# Patient Record
Sex: FEMALE | Race: WHITE | NOT HISPANIC OR LATINO | Employment: FULL TIME | URBAN - METROPOLITAN AREA
[De-identification: names, ages, dates, MRNs, and addresses within clinical notes are randomized per-mention and may not be internally consistent; named-entity substitution may affect disease eponyms.]

---

## 2024-03-04 ENCOUNTER — OFFICE VISIT (OUTPATIENT)
Dept: URGENT CARE | Facility: CLINIC | Age: 29
End: 2024-03-04
Payer: COMMERCIAL

## 2024-03-04 VITALS
DIASTOLIC BLOOD PRESSURE: 74 MMHG | BODY MASS INDEX: 26.12 KG/M2 | HEIGHT: 64 IN | RESPIRATION RATE: 16 BRPM | HEART RATE: 80 BPM | OXYGEN SATURATION: 100 % | TEMPERATURE: 99 F | WEIGHT: 153 LBS | SYSTOLIC BLOOD PRESSURE: 129 MMHG

## 2024-03-04 DIAGNOSIS — J01.40 ACUTE NON-RECURRENT PANSINUSITIS: Primary | ICD-10-CM

## 2024-03-04 DIAGNOSIS — J02.9 SORE THROAT: ICD-10-CM

## 2024-03-04 LAB
CTP QC/QA: YES
MOLECULAR STREP A: NEGATIVE

## 2024-03-04 PROCEDURE — 96372 THER/PROPH/DIAG INJ SC/IM: CPT | Mod: ,,, | Performed by: FAMILY MEDICINE

## 2024-03-04 PROCEDURE — 87651 STREP A DNA AMP PROBE: CPT | Mod: QW,,, | Performed by: FAMILY MEDICINE

## 2024-03-04 PROCEDURE — 99214 OFFICE O/P EST MOD 30 MIN: CPT | Mod: 25,,, | Performed by: FAMILY MEDICINE

## 2024-03-04 RX ORDER — AMOXICILLIN AND CLAVULANATE POTASSIUM 875; 125 MG/1; MG/1
1 TABLET, FILM COATED ORAL EVERY 12 HOURS
Qty: 14 TABLET | Refills: 0 | Status: SHIPPED | OUTPATIENT
Start: 2024-03-04 | End: 2024-03-11

## 2024-03-04 RX ORDER — ALBUTEROL SULFATE 90 UG/1
2 AEROSOL, METERED RESPIRATORY (INHALATION) EVERY 4 HOURS PRN
COMMUNITY
Start: 2023-12-03

## 2024-03-04 RX ORDER — NORGESTREL AND ETHINYL ESTRADIOL 0.3-0.03MG
1 KIT ORAL
COMMUNITY
Start: 2024-02-26

## 2024-03-04 RX ORDER — BETAMETHASONE SODIUM PHOSPHATE AND BETAMETHASONE ACETATE 3; 3 MG/ML; MG/ML
6 INJECTION, SUSPENSION INTRA-ARTICULAR; INTRALESIONAL; INTRAMUSCULAR; SOFT TISSUE
Status: COMPLETED | OUTPATIENT
Start: 2024-03-04 | End: 2024-03-04

## 2024-03-04 RX ADMIN — BETAMETHASONE SODIUM PHOSPHATE AND BETAMETHASONE ACETATE 6 MG: 3; 3 INJECTION, SUSPENSION INTRA-ARTICULAR; INTRALESIONAL; INTRAMUSCULAR; SOFT TISSUE at 06:03

## 2024-03-05 NOTE — PROGRESS NOTES
"Subjective:      Patient ID: Shannon Phipps is a 28 y.o. female.    Vitals:  height is 5' 4" (1.626 m) and weight is 69.4 kg (153 lb). Her temperature is 98.9 °F (37.2 °C). Her blood pressure is 129/74 and her pulse is 80. Her respiration is 16 and oxygen saturation is 100%.     Chief Complaint: Sore Throat (Sore throat, congestion, green mucus x last Tuesday. Taking Mucinex. )    6 days of sinusitis, cough, congestion.  No fever. No CP or SOB.          Constitution: Negative for chills, fatigue and fever.   HENT:  Positive for congestion, postnasal drip, sinus pressure and sore throat. Negative for trouble swallowing.    Neck: Negative for neck pain and neck stiffness.   Cardiovascular:  Negative for chest pain, leg swelling and sob on exertion.   Respiratory:  Positive for cough. Negative for chest tightness, shortness of breath and wheezing.    Neurological:  Negative for dizziness, disorientation and altered mental status.   Psychiatric/Behavioral:  Negative for altered mental status and disorientation.       Objective:     Physical Exam   Constitutional: She is oriented to person, place, and time. She appears well-developed.  Non-toxic appearance. No distress.   HENT:   Head: Normocephalic.   Ears:   Right Ear: Tympanic membrane and external ear normal.   Left Ear: Tympanic membrane and external ear normal.   Nose: Rhinorrhea present.   Mouth/Throat: Uvula is midline and mucous membranes are normal. No uvula swelling. Posterior oropharyngeal erythema and cobblestoning present. No oropharyngeal exudate or posterior oropharyngeal edema. Tonsils are 0 on the right. Tonsils are 0 on the left. No tonsillar exudate.   Eyes: Pupils are equal, round, and reactive to light. Right eye exhibits no discharge. Left eye exhibits no discharge.   Neck: Neck supple. No tracheal deviation present.   Cardiovascular: Normal rate, regular rhythm and normal heart sounds.   No murmur heard.  Pulmonary/Chest: Effort normal and breath " sounds normal. No stridor. No respiratory distress. She has no wheezes.   Lymphadenopathy:     She has no cervical adenopathy.   Neurological: no focal deficit. She is alert and oriented to person, place, and time.   Skin: Skin is warm and dry.   Psychiatric: Thought content normal.   Nursing note and vitals reviewed.      Assessment:     1. Acute non-recurrent pansinusitis    2. Sore throat        Plan:       Acute non-recurrent pansinusitis  -     betamethasone acetate-betamethasone sodium phosphate injection 6 mg  -     amoxicillin-clavulanate 875-125mg (AUGMENTIN) 875-125 mg per tablet; Take 1 tablet by mouth every 12 (twelve) hours. for 7 days  Dispense: 14 tablet; Refill: 0    Sore throat  -     POCT Strep A, Molecular              Strep test negative.

## 2024-03-14 ENCOUNTER — OFFICE VISIT (OUTPATIENT)
Dept: URGENT CARE | Facility: CLINIC | Age: 29
End: 2024-03-14
Payer: COMMERCIAL

## 2024-03-14 VITALS
HEART RATE: 91 BPM | TEMPERATURE: 98 F | WEIGHT: 150 LBS | BODY MASS INDEX: 25.61 KG/M2 | HEIGHT: 64 IN | RESPIRATION RATE: 18 BRPM | DIASTOLIC BLOOD PRESSURE: 84 MMHG | OXYGEN SATURATION: 100 % | SYSTOLIC BLOOD PRESSURE: 131 MMHG

## 2024-03-14 DIAGNOSIS — U07.1 COVID: Primary | ICD-10-CM

## 2024-03-14 DIAGNOSIS — J02.9 SORE THROAT: ICD-10-CM

## 2024-03-14 LAB
CTP QC/QA: YES
MOLECULAR STREP A: NEGATIVE
POC MOLECULAR INFLUENZA A AGN: NEGATIVE
POC MOLECULAR INFLUENZA B AGN: NEGATIVE
SARS-COV-2 RDRP RESP QL NAA+PROBE: POSITIVE

## 2024-03-14 PROCEDURE — 87502 INFLUENZA DNA AMP PROBE: CPT | Mod: QW,,, | Performed by: FAMILY MEDICINE

## 2024-03-14 PROCEDURE — 87651 STREP A DNA AMP PROBE: CPT | Mod: QW,,, | Performed by: FAMILY MEDICINE

## 2024-03-14 PROCEDURE — 99214 OFFICE O/P EST MOD 30 MIN: CPT | Mod: ,,, | Performed by: FAMILY MEDICINE

## 2024-03-14 PROCEDURE — 87635 SARS-COV-2 COVID-19 AMP PRB: CPT | Mod: QW,,, | Performed by: FAMILY MEDICINE

## 2024-03-14 RX ORDER — PREDNISONE 20 MG/1
20 TABLET ORAL 2 TIMES DAILY
Qty: 6 TABLET | Refills: 0 | Status: SHIPPED | OUTPATIENT
Start: 2024-03-14 | End: 2024-03-17

## 2024-03-14 NOTE — PROGRESS NOTES
"Subjective:      Patient ID: Shannon Phipps is a 28 y.o. female.    Vitals:  height is 5' 4" (1.626 m) and weight is 68 kg (150 lb). Her temperature is 98.4 °F (36.9 °C). Her blood pressure is 131/84 and her pulse is 91. Her respiration is 18 and oxygen saturation is 100%.     Chief Complaint: Sinus Problem (Congestion, cough, sore throat, chills, sweats x Tuesday. Here on 3/4 for similar symptoms, given steroid injection and Augmentin -- did get better. )    2 days of cough, congestion, pharyngitis. No fever. Similar symptoms a couple of weeks ago that resolved.          Constitution: Negative for chills, fatigue and fever.   HENT:  Positive for congestion, postnasal drip and sore throat. Negative for sinus pressure and trouble swallowing.    Neck: Negative for neck pain and neck stiffness.   Cardiovascular:  Negative for chest pain, leg swelling and sob on exertion.   Respiratory:  Positive for cough. Negative for chest tightness, shortness of breath and wheezing.    Neurological:  Negative for dizziness, disorientation and altered mental status.   Psychiatric/Behavioral:  Negative for altered mental status and disorientation.       Objective:     Physical Exam   Constitutional: She is oriented to person, place, and time. She appears well-developed.  Non-toxic appearance. No distress.   HENT:   Head: Normocephalic.   Ears:   Right Ear: Tympanic membrane and external ear normal.   Left Ear: Tympanic membrane and external ear normal.   Nose: Rhinorrhea present.   Mouth/Throat: Uvula is midline and mucous membranes are normal. No uvula swelling. Posterior oropharyngeal erythema and cobblestoning present. No oropharyngeal exudate or posterior oropharyngeal edema. Tonsils are 0 on the right. Tonsils are 0 on the left. No tonsillar exudate.   Eyes: Pupils are equal, round, and reactive to light. Right eye exhibits no discharge. Left eye exhibits no discharge.   Neck: Neck supple. No tracheal deviation present. "   Cardiovascular: Normal rate, regular rhythm and normal heart sounds.   No murmur heard.  Pulmonary/Chest: Effort normal and breath sounds normal. No stridor. No respiratory distress. She has no wheezes.   Lymphadenopathy:     She has no cervical adenopathy.   Neurological: no focal deficit. She is alert and oriented to person, place, and time.   Skin: Skin is warm and dry.   Psychiatric: Thought content normal.   Nursing note and vitals reviewed.      Assessment:     1. COVID    2. Sore throat        Plan:       COVID  -     predniSONE (DELTASONE) 20 MG tablet; Take 1 tablet (20 mg total) by mouth 2 (two) times daily. for 3 days  Dispense: 6 tablet; Refill: 0    Sore throat  -     POCT Influenza A/B MOLECULAR  -     POCT Strep A, Molecular  -     POCT COVID-19 Rapid Screening           COVID positive.   Strep and Flu tests negative.

## 2024-03-14 NOTE — PATIENT INSTRUCTIONS
When fever free for 24 hours and without severe symptoms you can stop home quarantine.  Consider wearing a mask when around others until past 10 days of symptoms.  Take prednisone with food up to twice a day.

## 2024-03-24 ENCOUNTER — OFFICE VISIT (OUTPATIENT)
Dept: URGENT CARE | Facility: CLINIC | Age: 29
End: 2024-03-24
Payer: COMMERCIAL

## 2024-03-24 VITALS
DIASTOLIC BLOOD PRESSURE: 77 MMHG | OXYGEN SATURATION: 96 % | SYSTOLIC BLOOD PRESSURE: 102 MMHG | HEIGHT: 64 IN | HEART RATE: 82 BPM | WEIGHT: 150 LBS | BODY MASS INDEX: 25.61 KG/M2 | RESPIRATION RATE: 18 BRPM | TEMPERATURE: 98 F

## 2024-03-24 DIAGNOSIS — J32.9 BACTERIAL SINUSITIS: Primary | ICD-10-CM

## 2024-03-24 DIAGNOSIS — B96.89 BACTERIAL SINUSITIS: Primary | ICD-10-CM

## 2024-03-24 PROCEDURE — 99213 OFFICE O/P EST LOW 20 MIN: CPT | Mod: ,,, | Performed by: FAMILY MEDICINE

## 2024-03-24 RX ORDER — IPRATROPIUM BROMIDE 21 UG/1
2 SPRAY, METERED NASAL 3 TIMES DAILY
Qty: 30 ML | Refills: 0 | Status: SHIPPED | OUTPATIENT
Start: 2024-03-24 | End: 2024-03-29

## 2024-03-24 RX ORDER — PREDNISONE 20 MG/1
40 TABLET ORAL DAILY
Qty: 10 TABLET | Refills: 0 | Status: SHIPPED | OUTPATIENT
Start: 2024-03-24 | End: 2024-03-29

## 2024-03-24 RX ORDER — AMOXICILLIN AND CLAVULANATE POTASSIUM 875; 125 MG/1; MG/1
1 TABLET, FILM COATED ORAL EVERY 12 HOURS
Qty: 14 TABLET | Refills: 0 | Status: SHIPPED | OUTPATIENT
Start: 2024-03-24 | End: 2024-03-31

## 2024-03-24 NOTE — PATIENT INSTRUCTIONS
Prednisone once daily for up to 5 days    Please take Atrovent (ipratropium) nasal spray once or twice daily as needed for nasal congestion for up to 5 days.  Please do not use this nasal spray for longer than 5 consecutive days, as tolerance will develop and your nose will become dependent on the nasal spray.  Considering using in only one nostril per night, alternating each night.    Augmentin twice daily for 7 days    Drink plenty of fluids.      Get plenty of rest.      Tylenol or Motrin as needed.      Go to the ER with any significant change or worsening of symptoms.

## 2024-03-24 NOTE — PROGRESS NOTES
Patient ID: [id number]     Chief Complaint: upper respiratory tract infection symptoms    History of Present Illness:     Shannon Phipps is a 28 y.o. female  who presents today for symptoms of Ear Fullness (Dx covid 3/14/24, bilateral ear pain, nasal drainage and productive cough, green )  The sinus pressure and nasal congestion has been going on for longer than 10 days.  In fact it precedes her COVID infection by about a week.    Pt denies experiencing any fevers, chills, nausea, vomiting, difficulty breathing, dysphagia, or neck stiffness.    Past Medical History:     ----------------------------  Allergy     Past Surgical History:   Procedure Laterality Date    ADENOIDECTOMY      AUGMENTATION OF BREAST      TONSILLECTOMY         Review of patient's allergies indicates:   Allergen Reactions    Codeine Hives and Nausea And Vomiting       Outpatient Medications Marked as Taking for the 3/24/24 encounter (Office Visit) with Tanmay Mendoza MD   Medication Sig Dispense Refill    LOW-OGESTREL, 28, 0.3-30 mg-mcg per tablet Take 1 tablet by mouth.         Social History     Socioeconomic History    Marital status:    Tobacco Use    Smoking status: Never    Smokeless tobacco: Never   Substance and Sexual Activity    Alcohol use: Yes     Comment: occasional    Drug use: Never        Family History   Problem Relation Age of Onset    No Known Problems Mother     No Known Problems Father         Subjective:     Review of Systems   Constitutional:  Negative for chills, fever and malaise/fatigue.   HENT:  Positive for congestion, ear pain and sinus pain. Negative for ear discharge and sore throat.    Respiratory:  Negative for cough, sputum production, shortness of breath, wheezing and stridor.    Gastrointestinal:  Negative for abdominal pain, diarrhea, nausea and vomiting.   Genitourinary:  Negative for dysuria, frequency and urgency.   Musculoskeletal:  Negative for neck pain.   Skin:  Negative for rash.    Neurological:  Negative for headaches.       Objective:     Vitals:    03/24/24 1335   BP: 102/77   Pulse: 82   Resp: 18   Temp: 97.9 °F (36.6 °C)     Body mass index is 25.75 kg/m².    Physical Exam  Constitutional:       General: She is not in acute distress.     Appearance: Normal appearance. She is not ill-appearing or toxic-appearing.   HENT:      Head: Normocephalic and atraumatic.      Right Ear: Tympanic membrane and ear canal normal.      Left Ear: Tympanic membrane and ear canal normal.      Nose: No congestion or rhinorrhea.      Mouth/Throat:      Pharynx: Oropharynx is clear. No oropharyngeal exudate or posterior oropharyngeal erythema.   Eyes:      General:         Right eye: No discharge.         Left eye: No discharge.      Extraocular Movements: Extraocular movements intact.      Conjunctiva/sclera: Conjunctivae normal.   Cardiovascular:      Rate and Rhythm: Normal rate and regular rhythm.      Heart sounds: Normal heart sounds. No murmur heard.     No gallop.   Pulmonary:      Effort: Pulmonary effort is normal. No respiratory distress.      Breath sounds: Normal breath sounds. No stridor. No wheezing, rhonchi or rales.   Chest:      Chest wall: No tenderness.   Musculoskeletal:      Cervical back: No rigidity or tenderness.   Lymphadenopathy:      Cervical: No cervical adenopathy.   Neurological:      Mental Status: She is alert and oriented to person, place, and time. Mental status is at baseline.   Psychiatric:         Mood and Affect: Mood normal.         Behavior: Behavior normal.         Assessment & Plan:       ICD-10-CM ICD-9-CM   1. Bacterial sinusitis  J32.9 473.9    B96.89 041.9        1. Bacterial sinusitis    Other orders  -     predniSONE (DELTASONE) 20 MG tablet; Take 2 tablets (40 mg total) by mouth once daily. for 5 days  Dispense: 10 tablet; Refill: 0  -     ipratropium (ATROVENT) 21 mcg (0.03 %) nasal spray; 2 sprays by Each Nostril route 3 (three) times daily. for 5 days   Dispense: 30 mL; Refill: 0  -     amoxicillin-clavulanate 875-125mg (AUGMENTIN) 875-125 mg per tablet; Take 1 tablet by mouth every 12 (twelve) hours. for 7 days  Dispense: 14 tablet; Refill: 0          Augmentin therapy indicated for acute bacterial sinusitis pursuant to [employer] society of [place] guidelines.  We will give Atrovent to dry up the upper airway cough syndrome and rhinorrhea which is giving her most of the problem, discussed tolerance.  We will also give her steroids for general anti-inflammatory effect.